# Patient Record
Sex: FEMALE | URBAN - METROPOLITAN AREA
[De-identification: names, ages, dates, MRNs, and addresses within clinical notes are randomized per-mention and may not be internally consistent; named-entity substitution may affect disease eponyms.]

---

## 2017-04-06 ENCOUNTER — TELEPHONE (OUTPATIENT)
Dept: OPTOMETRY | Facility: CLINIC | Age: 22
End: 2017-04-06

## 2017-04-06 NOTE — TELEPHONE ENCOUNTER
Call to verify contacts   Record #: [id number]  Rx Brand of contacts: [employer]  QTY: 1 box  Right eye power numbers: 8.7 14.5 -1.50 -0.75x 130  Rx brand of contacts: [employer]  QTY: 1 box  Left eye power numbers: 8.7 14.5 -1.50 - 1.75 x 060  Fax number: [phone]  Contact info:   Date the prescription was written: 12/2/15  The expiration date the provider entered for that RX: 12/7/17  The provider that wrote the RX: Gerald Barnes OD  Pt Address verified as matched   Fax verification on April 4, 2017  at 8:41 AM  Mod[facility] Rubi [employer]